# Patient Record
Sex: FEMALE | URBAN - METROPOLITAN AREA
[De-identification: names, ages, dates, MRNs, and addresses within clinical notes are randomized per-mention and may not be internally consistent; named-entity substitution may affect disease eponyms.]

---

## 2022-03-08 ENCOUNTER — REFRACTIVE (OUTPATIENT)
Dept: URBAN - METROPOLITAN AREA CLINIC 37 | Facility: CLINIC | Age: 37
End: 2022-03-08

## 2022-03-08 DIAGNOSIS — H52.13 MYOPIA, BILATERAL: Primary | ICD-10-CM

## 2022-03-08 ASSESSMENT — INTRAOCULAR PRESSURE
OD: 16
OS: 16

## 2022-03-08 ASSESSMENT — VISUAL ACUITY
OS: 20/20
OD: 20/20

## 2022-03-08 ASSESSMENT — KERATOMETRY
OD: 41.80
OS: 42.05

## 2022-03-18 ENCOUNTER — REFRACTIVE (OUTPATIENT)
Dept: URBAN - METROPOLITAN AREA CLINIC 37 | Facility: CLINIC | Age: 37
End: 2022-03-18

## 2022-03-18 PROCEDURE — LASIK LASIK PRKSURGEON'S FEE: CUSTOM | Performed by: OPHTHALMOLOGY

## 2022-03-18 PROCEDURE — LASIK LASIK SURGEON'S FEE: CUSTOM | Performed by: OPHTHALMOLOGY

## 2022-03-21 ENCOUNTER — POST-OPERATIVE VISIT (OUTPATIENT)
Dept: URBAN - METROPOLITAN AREA CLINIC 37 | Facility: CLINIC | Age: 37
End: 2022-03-21

## 2022-03-21 DIAGNOSIS — Z48.810 ENCOUNTER FOR SURGICAL AFTERCARE FOLLOWING SURGERY ON A SENSE ORGAN: Primary | ICD-10-CM

## 2022-03-21 PROCEDURE — 99024 POSTOP FOLLOW-UP VISIT: CPT | Performed by: OPTOMETRIST

## 2022-03-21 NOTE — IMPRESSION/PLAN
Impression: S/P PRK OU - 3 Days. Encounter for surgical aftercare following surgery on a sense organ  Z48.810.  Plan: rtc 2 weeks

## 2022-04-04 ENCOUNTER — POST-OPERATIVE VISIT (OUTPATIENT)
Dept: URBAN - METROPOLITAN AREA CLINIC 37 | Facility: CLINIC | Age: 37
End: 2022-04-04

## 2022-04-04 PROCEDURE — 99024 POSTOP FOLLOW-UP VISIT: CPT | Performed by: OPTOMETRIST

## 2022-04-04 ASSESSMENT — VISUAL ACUITY
OS: 20/40
OD: 20/30

## 2022-04-04 ASSESSMENT — INTRAOCULAR PRESSURE
OS: 16
OD: 16

## 2022-04-04 NOTE — IMPRESSION/PLAN
Impression: S/P PRK OU - 17 Days. Encounter for surgical aftercare following surgery on a sense organ  Z48.810.  Plan: rtc 2 weeks, cont gtts as directed

## 2022-04-18 ENCOUNTER — POST-OPERATIVE VISIT (OUTPATIENT)
Dept: URBAN - METROPOLITAN AREA CLINIC 37 | Facility: CLINIC | Age: 37
End: 2022-04-18

## 2022-04-18 DIAGNOSIS — Z48.810 ENCOUNTER FOR SURGICAL AFTERCARE FOLLOWING SURGERY ON A SENSE ORGAN: Primary | ICD-10-CM

## 2022-04-18 PROCEDURE — 99024 POSTOP FOLLOW-UP VISIT: CPT | Performed by: OPTOMETRIST

## 2022-04-18 ASSESSMENT — INTRAOCULAR PRESSURE
OS: 15
OD: 15

## 2022-04-18 ASSESSMENT — VISUAL ACUITY
OD: 20/25
OS: 20/20

## 2022-04-18 NOTE — IMPRESSION/PLAN
Impression: S/P PRK OU - 31 Days. Encounter for surgical aftercare following surgery on a sense organ  [id number]. Plan: AFts a s needed,   add gel again at bedtime.     rtc 1 month